# Patient Record
Sex: MALE | Race: WHITE | Employment: FULL TIME | URBAN - METROPOLITAN AREA
[De-identification: names, ages, dates, MRNs, and addresses within clinical notes are randomized per-mention and may not be internally consistent; named-entity substitution may affect disease eponyms.]

---

## 2018-11-03 ENCOUNTER — HOSPITAL ENCOUNTER (EMERGENCY)
Age: 23
Discharge: HOME | End: 2018-11-03
Attending: EMERGENCY MEDICINE
Payer: COMMERCIAL

## 2018-11-03 ENCOUNTER — APPOINTMENT (OUTPATIENT)
Dept: GENERAL RADIOLOGY | Age: 23
End: 2018-11-03
Attending: EMERGENCY MEDICINE
Payer: COMMERCIAL

## 2018-11-03 VITALS
OXYGEN SATURATION: 99 % | DIASTOLIC BLOOD PRESSURE: 80 MMHG | WEIGHT: 309.2 LBS | TEMPERATURE: 98.2 F | BODY MASS INDEX: 44.27 KG/M2 | RESPIRATION RATE: 16 BRPM | HEART RATE: 80 BPM | SYSTOLIC BLOOD PRESSURE: 125 MMHG | HEIGHT: 70 IN

## 2018-11-03 DIAGNOSIS — M72.2 PLANTAR FASCIITIS: Primary | ICD-10-CM

## 2018-11-03 PROCEDURE — 99283 EMERGENCY DEPT VISIT LOW MDM: CPT | Performed by: NURSE PRACTITIONER

## 2018-11-03 PROCEDURE — 73630 X-RAY EXAM OF FOOT: CPT

## 2018-11-03 RX ORDER — IBUPROFEN 600 MG/1
600 TABLET ORAL
Status: DISCONTINUED | OUTPATIENT
Start: 2018-11-03 | End: 2018-11-03 | Stop reason: HOSPADM

## 2018-11-03 RX ORDER — NAPROXEN 500 MG/1
500 TABLET ORAL 2 TIMES DAILY WITH MEALS
Qty: 20 TAB | Refills: 0 | Status: SHIPPED | OUTPATIENT
Start: 2018-11-03 | End: 2018-11-13

## 2018-11-03 NOTE — ED TRIAGE NOTES
Pt to ED c/o right lateral foot pain that has been ongoing for \"years\" but now patient can't walk on it. Possible aggravating factor was stretching it yesterday. No obvious deformity. Pt states he might have broken it before but never had it evaluated. Pt walked in to triage with limp.

## 2018-11-03 NOTE — ED PROVIDER NOTES
This patient presents to ED with a complaint of right lateral foot pain ongoing for several years, but states that it got significantly worse yesterday. He states that he was stretching his foot against the callus, as he thought that this was a chronic muscular problem, and states that he felt a sudden sharp pain in his lateral foot, and states that he has no head difficulty bearing weight on his foot since. The history is provided by the patient. No  was used. Foot Injury This is a new problem. The current episode started yesterday. The problem occurs constantly. The problem has not changed since onset. The pain is present in the right foot. The quality of the pain is described as sharp. The pain is at a severity of 9/10. The pain is severe. Associated symptoms include limited range of motion and stiffness. Pertinent negatives include no numbness, no back pain and no neck pain. The symptoms are aggravated by movement and palpation. He has tried rest for the symptoms. The treatment provided no relief. There has been no history of extremity trauma. History reviewed. No pertinent past medical history. Past Surgical History:  
Procedure Laterality Date  HX TONSIL AND ADENOIDECTOMY History reviewed. No pertinent family history. Social History Socioeconomic History  Marital status: SINGLE Spouse name: Not on file  Number of children: Not on file  Years of education: Not on file  Highest education level: Not on file Social Needs  Financial resource strain: Not on file  Food insecurity - worry: Not on file  Food insecurity - inability: Not on file  Transportation needs - medical: Not on file  Transportation needs - non-medical: Not on file Occupational History  Not on file Tobacco Use  Smoking status: Current Every Day Smoker Packs/day: 0.50  Smokeless tobacco: Never Used Substance and Sexual Activity  Alcohol use: Yes Comment: occasionally  Drug use: No  
 Sexual activity: Not on file Other Topics Concern  Not on file Social History Narrative  Not on file ALLERGIES: Patient has no known allergies. Review of Systems Constitutional: Negative for chills and fever. Respiratory: Negative for chest tightness and shortness of breath. Cardiovascular: Negative for chest pain and palpitations. Gastrointestinal: Negative for nausea and vomiting. Genitourinary: Negative for difficulty urinating and flank pain. Musculoskeletal: Positive for arthralgias and stiffness. Negative for back pain, joint swelling and neck pain. Skin: Negative for color change, rash and wound. Neurological: Negative for dizziness, syncope and numbness. Hematological: Negative for adenopathy. Does not bruise/bleed easily. Vitals:  
 11/03/18 1419 BP: 122/85 Pulse: 90 Resp: 16 Temp: 98.2 °F (36.8 °C) SpO2: 98% Weight: 140.3 kg (309 lb 3.2 oz) Height: 5' 10\" (1.778 m) Physical Exam  
Constitutional: He is oriented to person, place, and time. He appears well-developed and well-nourished. No distress. HENT:  
Head: Normocephalic and atraumatic. Neck: Normal range of motion. No tracheal deviation present. Cardiovascular: Normal rate and intact distal pulses. Pulmonary/Chest: Effort normal. No respiratory distress. Musculoskeletal: He exhibits tenderness. He exhibits no edema. No appreciable swelling or deformity to the right lateral foot. However, there is significant tenderness to palpation along the base of the fifth metatarsal.  No other tenderness to palpation, including the lateral and medial ankle and medial foot. Neurological: He is alert and oriented to person, place, and time. Skin: Skin is warm and dry. Capillary refill takes less than 2 seconds. No erythema. MDM Number of Diagnoses or Management Options Plantar fasciitis: Diagnosis management comments: X-ray negative for fracture. Given the physical examination, and chronicity of the patient's symptoms, I suspect that this could be related to a chronic inflammatory problems such as plantar fasciitis. I provided stretching exercises, and conservative treatment for the patient, including compressive therapy and elevation. We'll also prescribe anti-inflammatory medicines. I discussed this plan of care with the patient, including the need to follow up with PCP. The patient voiced full understanding and compliance with the plan. Amount and/or Complexity of Data Reviewed Tests in the radiology section of CPT®: ordered and reviewed Risk of Complications, Morbidity, and/or Mortality Presenting problems: low Diagnostic procedures: minimal 
Management options: minimal 
 
Patient Progress Patient progress: stable Procedures Portions of this chart were dictated using a voice-to-text program, JUANIS Fitzgerald. While care was taken to eliminate any dictation inaccuracies, please excuse any dictation errors.

## 2018-11-03 NOTE — LETTER
[address] [facility] EMERGENCY DEPT 
[address] [place] [id number] 
[phone] Work/School Note Date: 11/3/2018 To Whom It May concern: 
 
Maurisio Mock was seen and treated today in the emergency room by the following provider(s): 
Attending Provider: David Jean MD 
Nurse Practitioner: Nata Avila NP. Maurisio Mock may return to work on 11/5/2018.  
 
Sincerely, 
 
 
 
 
Lacy Hennessy NP

## 2018-11-03 NOTE — DISCHARGE INSTRUCTIONS
Rest, elevate the foot, and use the Ace wrap provided to help provide some compressive support. I strongly encouraged her to use some good inserts or provide good foot support when working, as this will help alleviate her symptoms over time. Take the naproxen as prescribed help decrease the inflammation and pain. I recommend following up with your primary doctor as discussed for further evaluation and treatment. It may be necessary for you to seek physical therapy treatment to help alleviate her symptoms. Return to the ED if he has any new or worsening symptoms, including fever, numbness, difficulty walking, or new injury. Plantar Fasciitis: Exercises  Your Care Instructions  Here are some examples of typical rehabilitation exercises for your condition. Start each exercise slowly. Ease off the exercise if you start to have pain. Your doctor or physical therapist will tell you when you can start these exercises and which ones will work best for you. How to do the exercises  Towel stretch    1. Sit with your legs extended and knees straight. 2. Place a towel around your foot just under the toes. 3. Hold each end of the towel in each hand, with your hands above your knees. 4. Pull back with the towel so that your foot stretches toward you. 5. Hold the position for at least 15 to 30 seconds. 6. Repeat 2 to 4 times a session, up to 5 sessions a day. Calf stretch    1. Stand facing a wall with your hands on the wall at about eye level. Put the leg you want to stretch about a step behind your other leg. 2. Keeping your back heel on the floor, bend your front knee until you feel a stretch in the back leg. 3. Hold the stretch for 15 to 30 seconds. Repeat 2 to 4 times. Plantar fascia and calf stretch    1. Stand on a step as shown above. Be sure to hold on to the banister. 2. Slowly let your heels down over the edge of the step as you relax your calf muscles.  You should feel a gentle stretch across the bottom of your foot and up the back of your leg to your knee. 3. Hold the stretch about 15 to 30 seconds, and then tighten your calf muscle a little to bring your heel back up to the level of the step. Repeat 2 to 4 times. Towel curls    1. While sitting, place your foot on a towel on the floor and scrunch the towel toward you with your toes. 2. Then, also using your toes, push the towel away from you. [place] pickups    1. Put marbles on the floor next to a cup.  2. Using your toes, try to lift the marbles up from the floor and put them in the cup. Follow-up care is a key part of your treatment and safety. Be sure to make and go to all appointments, and call your doctor if you are having problems. It's also a good idea to know your test results and keep a list of the medicines you take. Where can you learn more? Go to http://jesseSkubanamichael.info/. Corinne Meehan in the search box to learn more about \"Plantar Fasciitis: Exercises. \"  Current as of: November 29, 2017  Content Version: 11.8  © 1992-9018 [employer]. Care instructions adapted under license by [employer] (which disclaims liability or warranty for this information). If you have questions about a medical condition or this instruction, always ask your healthcare professional. [place] [address] any warranty or liability for your use of this information. Plantar Fasciitis: Care Instructions  Your Care Instructions    Plantar fasciitis is pain and inflammation of the plantar fascia, the tissue at the bottom of your foot that connects the heel bone to the toes. The plantar fascia also supports the arch. If you strain the plantar fascia, it can develop small tears and cause heel pain when you stand or walk. Plantar fasciitis can be caused by running or other sports. It also may occur in people who are overweight or who have high arches or flat feet.  You may get plantar fasciitis if you walk or stand for long periods, or have a tight Achilles tendon or calf muscles. You can improve your foot pain with rest and other care at home. It might take a few weeks to a few months for your foot to heal completely. Follow-up care is a key part of your treatment and safety. Be sure to make and go to all appointments, and call your doctor if you are having problems. It's also a good idea to know your test results and keep a list of the medicines you take. How can you care for yourself at home? · Rest your feet often. Reduce your activity to a level that lets you avoid pain. If possible, do not run or walk on hard surfaces. · Take pain medicines exactly as directed. ? If the doctor gave you a prescription medicine for pain, take it as prescribed. ? If you are not taking a prescription pain medicine, take an over-the-counter anti-inflammatory medicine for pain and swelling, such as ibuprofen (Advil, Motrin) or naproxen (Aleve). Read and follow all instructions on the label. · Use ice massage to help with pain and swelling. You can use an ice cube or an ice cup several times a day. To make an ice cup, fill a paper cup with water and freeze it. Cut off the top of the cup until a half-inch of ice shows. Hold onto the remaining paper to use the cup. Rub the ice in small circles over the area for 5 to 7 minutes. · Contrast baths, which alternate hot and cold water, can also help reduce swelling. But because heat alone may make pain and swelling worse, end a contrast bath with a soak in cold water. · Wear a night splint if your doctor suggests it. A night splint holds your foot with the toes pointed up and the foot and ankle at a 90-degree angle. This position gives the bottom of your foot a constant, gentle stretch. · Do simple exercises such as calf stretches and towel stretches 2 to 3 times each day, especially when you first get up in the morning.  These can help the plantar fascia become more flexible. They also make the muscles that support your arch stronger. Hold these stretches for 15 to 30 seconds per stretch. Repeat 2 to 4 times. ? Stand about 1 foot from a wall. Place the palms of both hands against the wall at chest level. Lean forward against the wall, keeping one leg with the knee straight and heel on the ground while bending the knee of the other leg.  ? Sit down on the floor or a mat with your feet stretched in front of you. Roll up a towel lengthwise, and loop it over the ball of your foot. Holding the towel at both ends, gently pull the towel toward you to stretch your foot. · Wear shoes with good arch support. Athletic shoes or shoes with a well-cushioned sole are good choices. · Try heel cups or shoe inserts (orthotics) to help cushion your heel. You can buy these at many shoe stores. · Put on your shoes as soon as you get out of bed. Going barefoot or wearing slippers may make your pain worse. · Reach and stay at a good weight for your height. This puts less strain on your feet. When should you call for help? Call your doctor now or seek immediate medical care if:    · You have heel pain with fever, redness, or warmth in your heel.     · You cannot put weight on the sore foot.    Watch closely for changes in your health, and be sure to contact your doctor if:    · You have numbness or tingling in your heel.     · Your heel pain lasts more than 2 weeks. Where can you learn more? Go to http://jesse-michael.info/. Steve Kapoor in the search box to learn more about \"Plantar Fasciitis: Care Instructions. \"  Current as of: November 29, 2017  Content Version: 11.8  © 7229-9347 [employer]. Care instructions adapted under license by [employer] (which disclaims liability or warranty for this information).  If you have questions about a medical condition or this instruction, always ask your healthcare professional. Smiley Everett disclaims any warranty or liability for your use of this information.

## 2018-11-03 NOTE — ED NOTES
I have reviewed discharge instructions with the patient. The patient verbalized understanding. Patient left ED via Discharge Method: ambulatory to Home with self. Opportunity for questions and clarification provided. Patient given 1 scripts. To continue your aftercare when you leave the hospital, you may receive an automated call from our care team to check in on how you are doing. This is a free service and part of our promise to provide the best care and service to meet your aftercare needs.  If you have questions, or wish to unsubscribe from this service please call [phone]. Thank you for Choosing our [facility] Emergency Department.

## 2018-11-22 ENCOUNTER — HOSPITAL ENCOUNTER (EMERGENCY)
Age: 23
Discharge: HOME | End: 2018-11-23
Attending: EMERGENCY MEDICINE
Payer: COMMERCIAL

## 2018-11-22 DIAGNOSIS — R03.0 ELEVATED BLOOD PRESSURE READING: ICD-10-CM

## 2018-11-22 DIAGNOSIS — M72.2 PLANTAR FASCIITIS: ICD-10-CM

## 2018-11-22 DIAGNOSIS — M79.671 RIGHT FOOT PAIN: Primary | ICD-10-CM

## 2018-11-22 PROCEDURE — 99282 EMERGENCY DEPT VISIT SF MDM: CPT | Performed by: EMERGENCY MEDICINE

## 2018-11-22 NOTE — LETTER
NOTIFICATION RETURN TO WORK / SCHOOL 
 
11/23/2018 1:19 AM 
 
Mr. Jose Alberto Frank [address] [facility] [address] [place] [address],[address] [place], [place] [id number] To Whom It May Concern: 
 
Jose Alberto Frank is currently under the care of [facility] EMERGENCY DEPT. He will return to work/school on: 11/23/2018 If there are questions or concerns please have the patient contact our office. Sincerely, 
 
 
 
 
Jovon Ricks MD

## 2018-11-23 ENCOUNTER — APPOINTMENT (OUTPATIENT)
Dept: GENERAL RADIOLOGY | Age: 23
End: 2018-11-23
Attending: EMERGENCY MEDICINE
Payer: COMMERCIAL

## 2018-11-23 VITALS
SYSTOLIC BLOOD PRESSURE: 178 MMHG | OXYGEN SATURATION: 96 % | WEIGHT: 315 LBS | BODY MASS INDEX: 39.17 KG/M2 | HEIGHT: 75 IN | HEART RATE: 97 BPM | RESPIRATION RATE: 16 BRPM | TEMPERATURE: 98.3 F | DIASTOLIC BLOOD PRESSURE: 109 MMHG

## 2018-11-23 PROCEDURE — 73630 X-RAY EXAM OF FOOT: CPT

## 2018-11-23 NOTE — ED NOTES
I have reviewed discharge instructions with the patient. The patient verbalized understanding. Patient left ED via Discharge Method: ambulatory to Home with self. Opportunity for questions and clarification provided. Patient given 0 scripts. To continue your aftercare when you leave the hospital, you may receive an automated call from our care team to check in on how you are doing. This is a free service and part of our promise to provide the best care and service to meet your aftercare needs.  If you have questions, or wish to unsubscribe from this service please call [phone]. Thank you for Choosing our [facility] Emergency Department.

## 2018-11-23 NOTE — ED PROVIDER NOTES
25-year-old male presents with complaint of continued right foot pain since he was initially evaluated on 11/30/2018 in ED. States he was diagnosed with plantar fasciitis and has been taking naproxen. States he was unable to get a primary care physician lined up for follow-up. Patient reports continued pain to the lateral aspect of the sole of his R foot with weight bearing. Denies numbness, weakness, tingling, recent trauma or injury. States that he did take 3 days off work for his plantar fasciitis. States that he needs medical clearance so that he can return back to work. Denies chest pain, shortness of breath, nausea, vomiting, headache, vision disturbance. The history is provided by the patient. No  was used. Foot Pain This is a recurrent problem. The current episode started more than 1 week ago. The problem occurs constantly. The problem has not changed since onset. The pain is present in the right foot. The quality of the pain is described as sharp. The pain is at a severity of 2/10. The pain is mild. Pertinent negatives include no numbness, full range of motion, no stiffness, no tingling and no itching. He has tried nothing for the symptoms. The treatment provided no relief. History reviewed. No pertinent past medical history. Past Surgical History:  
Procedure Laterality Date  HX TONSIL AND ADENOIDECTOMY History reviewed. No pertinent family history. Social History Socioeconomic History  Marital status: SINGLE Spouse name: Not on file  Number of children: Not on file  Years of education: Not on file  Highest education level: Not on file Social Needs  Financial resource strain: Not on file  Food insecurity - worry: Not on file  Food insecurity - inability: Not on file  Transportation needs - medical: Not on file  Transportation needs - non-medical: Not on file Occupational History  Not on file Tobacco Use  Smoking status: Current Every Day Smoker Packs/day: 0.50  Smokeless tobacco: Never Used Substance and Sexual Activity  Alcohol use: Yes Comment: occasionally  Drug use: No  
 Sexual activity: Not on file Other Topics Concern  Not on file Social History Narrative  Not on file ALLERGIES: Patient has no known allergies. Review of Systems Constitutional: Negative for chills and fever. Respiratory: Negative for cough and shortness of breath. Cardiovascular: Negative for leg swelling. Gastrointestinal: Negative for nausea and vomiting. Musculoskeletal: Positive for arthralgias. Negative for gait problem, joint swelling, myalgias and stiffness. Skin: Negative for itching. Neurological: Negative for tingling, weakness and numbness. Vitals:  
 11/22/18 2347 BP: 188/87 Pulse: 93 Resp: 16 Temp: 98.1 °F (36.7 °C) SpO2: 98% Weight: 145.2 kg (320 lb) Height: 6' 3\" (1.905 m) Physical Exam  
Constitutional: He is oriented to person, place, and time. He appears well-developed and well-nourished. Well appearing and in NAD. Neck: No JVD present. No tracheal deviation present. Cardiovascular: Normal rate and regular rhythm.  
RRR. Pulmonary/Chest: Effort normal and breath sounds normal.  
CTAB. Abdominal: Soft. Bowel sounds are normal.  
Musculoskeletal: Normal range of motion. Right foot: There is tenderness. There is normal range of motion, no swelling, no crepitus and no deformity. Feet: 
 
Lateral aspect of R sole of foot w/ mild tenderness to palpation. DP pulses 2+ and equal bilaterally. FROM of R foot. No overlying warmth of erythema. Normal sensory exam. No swelling noted to R foot. No deformity. No calf TTP. Neurological: He is alert and oriented to person, place, and time. No cranial nerve deficit. Strength 5/5 throughout. Normal sensory. Skin: Skin is warm and dry. No rash. Nursing note and vitals reviewed. MDM Number of Diagnoses or Management Options Elevated blood pressure reading:  
Plantar fasciitis:  
Right foot pain: new and requires workup Diagnosis management comments: XR performed again and negative. BP elevated. Pt asymptomatic. Will have patient follow-up with PCP in regards to elevated BP reading. Pt instructed that he can return to work at this time. Pt given return precautions. Amount and/or Complexity of Data Reviewed Tests in the radiology section of CPT®: ordered and reviewed Review and summarize past medical records: yes Independent visualization of images, tracings, or specimens: yes Risk of Complications, Morbidity, and/or Mortality Presenting problems: minimal 
Diagnostic procedures: minimal 
Management options: minimal 
 
Patient Progress Patient progress: stable ED Course as of Nov 23 0118 Fri Nov 23, 2018  
0117 XR R foot IMPRESSION: 
 
No acute bone abnormality. [DF] ED Course User Index 
[DF] Vianey Cruz MD  
 
 
Procedures Baylee Meneses MD; 11/23/2018 @12:21 AM Voice dictation software was used during the making of this note. This software is not perfect and grammatical and other typographical errors may be present.   This note has not been proofread for errors. 
===================================================================